# Patient Record
Sex: FEMALE | Race: WHITE | Employment: STUDENT | URBAN - METROPOLITAN AREA
[De-identification: names, ages, dates, MRNs, and addresses within clinical notes are randomized per-mention and may not be internally consistent; named-entity substitution may affect disease eponyms.]

---
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
## 2025-01-14 ENCOUNTER — MED REC SCAN ONLY (OUTPATIENT)
Dept: FAMILY MEDICINE CLINIC | Facility: CLINIC | Age: 20
End: 2025-01-14

## 2025-03-11 ENCOUNTER — HOSPITAL ENCOUNTER (OUTPATIENT)
Age: 20
Discharge: HOME | End: 2025-03-11
Payer: COMMERCIAL

## 2025-03-11 VITALS
DIASTOLIC BLOOD PRESSURE: 88 MMHG | OXYGEN SATURATION: 98 % | RESPIRATION RATE: 21 BRPM | TEMPERATURE: 97 F | SYSTOLIC BLOOD PRESSURE: 106 MMHG | HEART RATE: 85 BPM

## 2025-03-11 DIAGNOSIS — R73.9 HYPERGLYCEMIA: ICD-10-CM

## 2025-03-11 DIAGNOSIS — Z79.4 DIABETES MELLITUS DUE TO UNDERLYING CONDITION WITH HYPERGLYCEMIA, WITH LONG-TERM CURRENT USE OF INSULIN (HCC): ICD-10-CM

## 2025-03-11 DIAGNOSIS — T85.694A OTHER MECHANICAL COMPLICATION OF INSULIN PUMP, INITIAL ENCOUNTER: ICD-10-CM

## 2025-03-11 DIAGNOSIS — E08.65 DIABETES MELLITUS DUE TO UNDERLYING CONDITION WITH HYPERGLYCEMIA, WITH LONG-TERM CURRENT USE OF INSULIN (HCC): ICD-10-CM

## 2025-03-11 DIAGNOSIS — R53.83 OTHER FATIGUE: Primary | ICD-10-CM

## 2025-03-11 LAB
#MXD IC: 1.1 X10ˆ3/UL (ref 0.1–1)
BUN BLD-MCNC: 6 MG/DL (ref 7–18)
CHLORIDE BLD-SCNC: 103 MMOL/L (ref 98–112)
CO2 BLD-SCNC: 19 MMOL/L (ref 21–32)
COLOR UR: YELLOW
CREAT BLD-MCNC: 0.7 MG/DL
EGFRCR SERPLBLD CKD-EPI 2021: 128 ML/MIN/1.73M2 (ref 60–?)
GLUCOSE BLD-MCNC: 278 MG/DL (ref 70–99)
GLUCOSE BLD-MCNC: 309 MG/DL (ref 70–99)
GLUCOSE UR STRIP-MCNC: 500 MG/DL
HCT VFR BLD AUTO: 46.2 %
HCT VFR BLD CALC: 51 %
HGB BLD-MCNC: 15.8 G/DL
ISTAT IONIZED CALCIUM FOR CHEM 8: 1.19 MMOL/L (ref 1.12–1.32)
KETONES UR STRIP-MCNC: 80 MG/DL
LYMPHOCYTES # BLD AUTO: 3.2 X10ˆ3/UL (ref 1.5–5)
LYMPHOCYTES NFR BLD AUTO: 29.1 %
MCH RBC QN AUTO: 28.3 PG (ref 26–34)
MCHC RBC AUTO-ENTMCNC: 34.2 G/DL (ref 31–37)
MCV RBC AUTO: 82.8 FL (ref 80–100)
MIXED CELL %: 9.9 %
NEUTROPHILS # BLD AUTO: 6.6 X10ˆ3/UL (ref 1.5–7.7)
NEUTROPHILS NFR BLD AUTO: 61 %
NITRITE UR QL STRIP: NEGATIVE
PH UR STRIP: 6 [PH]
PLATELET # BLD AUTO: 385 X10ˆ3/UL (ref 150–450)
POTASSIUM BLD-SCNC: 3.8 MMOL/L (ref 3.6–5.1)
PROT UR STRIP-MCNC: 100 MG/DL
RBC # BLD AUTO: 5.58 X10ˆ6/UL
SODIUM BLD-SCNC: 136 MMOL/L (ref 136–145)
SP GR UR STRIP: 1.02
UROBILINOGEN UR STRIP-ACNC: <2 MG/DL
WBC # BLD AUTO: 10.9 X10ˆ3/UL (ref 4–11)

## 2025-03-11 PROCEDURE — 85025 COMPLETE CBC W/AUTO DIFF WBC: CPT | Performed by: NURSE PRACTITIONER

## 2025-03-11 PROCEDURE — 82962 GLUCOSE BLOOD TEST: CPT | Performed by: NURSE PRACTITIONER

## 2025-03-11 PROCEDURE — 96360 HYDRATION IV INFUSION INIT: CPT | Performed by: NURSE PRACTITIONER

## 2025-03-11 PROCEDURE — 80047 BASIC METABLC PNL IONIZED CA: CPT | Performed by: NURSE PRACTITIONER

## 2025-03-11 PROCEDURE — 81002 URINALYSIS NONAUTO W/O SCOPE: CPT | Performed by: NURSE PRACTITIONER

## 2025-03-11 PROCEDURE — 99214 OFFICE O/P EST MOD 30 MIN: CPT | Performed by: NURSE PRACTITIONER

## 2025-03-11 RX ORDER — BREXPIPRAZOLE 3 MG/1
3 TABLET ORAL
COMMUNITY
Start: 2025-03-01

## 2025-03-11 RX ORDER — ONDANSETRON 4 MG/1
4 TABLET, ORALLY DISINTEGRATING ORAL EVERY 4 HOURS PRN
Qty: 30 TABLET | Refills: 0 | Status: SHIPPED | OUTPATIENT
Start: 2025-03-11

## 2025-03-11 RX ORDER — SODIUM CHLORIDE 9 MG/ML
1000 INJECTION, SOLUTION INTRAVENOUS ONCE
Status: COMPLETED | OUTPATIENT
Start: 2025-03-11 | End: 2025-03-11

## 2025-03-11 RX ORDER — NAPROXEN SODIUM 220 MG
TABLET ORAL
Qty: 90 EACH | Refills: 0 | Status: SHIPPED | OUTPATIENT
Start: 2025-03-11

## 2025-03-11 NOTE — DISCHARGE INSTRUCTIONS
General Health Maintenance   - Overall goal: Improve body functioning through improved cellular health by decrease inflammation   - Move more (7k steps a day improves cardiovascular health - approx 1.5hrs of cumulative walking per day)   - Sleep better (consistent bedtime, sleep mask, magnesium glycinate)   - Eat \"real\" food (avoid high processed foods such as: snack foods, margarine, frozen foods, most breads)   - Avoid sugar which is very inflammatory and interferes with cellular function   - Avoid excessive dairy (milk, cheese, etc. [Yogurt is ok])   - Early exposure to daylight   - Drink more water (preferably filtered)   - Prioritize protein with every meal   - Supplement nutrient dietary deficiencies with multivitamins, extra vitamin D (2000IU), Magnesium glycinate, and probiotics daily, year round   - Yearly health monitoring by your primary care provider     *individual modifications may be required

## 2025-03-11 NOTE — ED PROVIDER NOTES
History     Chief Complaint   Patient presents with    Diabetes       Subjective:   HPI    Lily Herring, 19 year old adult with notable medical history of T1DM, celiac disease, low vitamin D, depression who presents with diabetes concern. Per patient and mother (with permission), patient has insulin pump, but has been having \"high\" glucose readings, needing to adjust insulin dosing with syringes. Insulin pump company was contacted an a replacement has been placed. Patient and mother c/f dehydration and/or other cause of her symptoms and fatigue. Patient has had a few instances of n/v. Of note, patient is trans, female>male      Patient Active Problem List   Diagnosis    Type 1 diabetes mellitus without complication (HCC)    Celiac disease (HCC)    Esotropia    Amblyopia of both eyes    Vitamin D deficiency, unspecified    Irregular menses    Current severe episode of major depressive disorder without psychotic features without prior episode (HCC)    Major depressive disorder, recurrent episode, severe (HCC)    Short stature (child)    Hydronephrosis      Objective:   Past Medical History:    Candidiasis, cutaneous    Celiac disease (HCC)    DIABETES    Dr Jeter, [place] [facility]    Diabetes type I (HCC)    Hydronephrosis    right greater than left    Type I (juvenile type) diabetes mellitus without mention of complication, not stated as uncontrolled              History reviewed. No pertinent surgical history.             Social History     Socioeconomic History    Marital status: Single   Tobacco Use    Smoking status: Never    Smokeless tobacco: Never   Vaping Use    Vaping status: Never Used   Substance and Sexual Activity    Alcohol use: No    Drug use: No    Sexual activity: Never   Other Topics Concern    Caffeine Concern Yes     Comment: 2-3 x/week    Exercise No    Seat Belt Yes   Social History Narrative    Lives with parents and is in 2nd grade as of july 2013.               Medications  Ordered Prior to Encounter[1]      Constitutional and vital signs reviewed.      All other systems reviewed and negative except as noted above.    I have reviewed the family history, social history, allergies, and outpatient medications.     History reviewed from EMR: Encounters, problem list, allergies, medications      Physical Exam     ED Triage Vitals [03/11/25 1449]   /88   Pulse (!) 132   Resp 21   Temp 97.3 °F (36.3 °C)   Temp src Oral   SpO2 98 %   O2 Device None (Room air)       Current:/88   Pulse 85   Temp 97.3 °F (36.3 °C) (Oral)   Resp 21   LMP 07/03/2024 (Approximate)   SpO2 98%       Physical Exam  Vitals and nursing note reviewed.   Constitutional:       General: He is not in acute distress.     Appearance: Normal appearance. He is normal weight. He is not ill-appearing or toxic-appearing.   HENT:      Head: Normocephalic and atraumatic.      Right Ear: External ear normal.      Left Ear: External ear normal.      Nose: Nose normal.      Mouth/Throat:      Mouth: Mucous membranes are moist.   Eyes:      Extraocular Movements: Extraocular movements intact.      Conjunctiva/sclera: Conjunctivae normal.      Pupils: Pupils are equal, round, and reactive to light.   Cardiovascular:      Rate and Rhythm: Tachycardia present.      Pulses: Normal pulses.   Pulmonary:      Effort: Pulmonary effort is normal. No respiratory distress.   Musculoskeletal:         General: No swelling, tenderness or signs of injury. Normal range of motion.      Cervical back: Normal range of motion and neck supple.   Skin:     General: Skin is warm and dry.      Capillary Refill: Capillary refill takes less than 2 seconds.      Coloration: Skin is not jaundiced.   Neurological:      General: No focal deficit present.      Mental Status: He is alert and oriented to person, place, and time. Mental status is at baseline.   Psychiatric:         Mood and Affect: Mood normal.         Behavior: Behavior normal.          Thought Content: Thought content normal.         Judgment: Judgment normal.            ED Course     Labs Reviewed   POCT CBC - Abnormal; Notable for the following components:       Result Value    # Mixed Cells 1.1 (*)     All other components within normal limits   POCT GLUCOSE - Abnormal; Notable for the following components:    POC Glucose 278 (*)     All other components within normal limits   [facility] POCT URINALYSIS DIPSTICK - Abnormal; Notable for the following components:    Urine Clarity Cloudy (*)     Protein urine 100 (*)     Glucose, Urine 500 (*)     Ketone, Urine 80 (*)     Bilirubin, Urine Moderate (*)     Blood, Urine Trace-Intact (*)     Leukocyte esterase urine Trace (*)     All other components within normal limits   POCT ISTAT CHEM8 CARTRIDGE - Abnormal; Notable for the following components:    ISTAT BUN 6 (*)     ISTAT Glucose 309 (*)     ISTAT TCO2 19 (*)     All other components within normal limits     No orders to display       Vitals:    03/11/25 1449 03/11/25 1541   BP: 106/88    Pulse: (!) 132 85   Resp: 21    Temp: 97.3 °F (36.3 °C)    TempSrc: Oral    SpO2: 98%             MDM        Lily A Herring, 19 year old adult with medical history as noted above who presents with diabetes / fatigue concern   - Patient in NAD, +tachycardia   - DKA vs HHNS vs dehydration vs viral vs other   - POC glucose 270mg/dL   - Patient does not appear to be in DKA   - Will check labs, urine, and give IVF       ** See ED course below for additional information on care provided / interventions / notable events throughout patient's encounter.    ** See Home Care Instructions below for care measures to trial as applicable.    ED Course as of 03/13/25 1114  ------------------------------------------------------------  Time: 03/11 1535  Comment: Labs w/o anemia and c/w hyperglycemia. No gross acidosis. Patient feeling well. No emesis or abdominal pain.  Supportive care discussed  RTED/IC precautions discussed  Close  f/u with care team       ** I have independently reviewed the radiology images, clinical lab results, and ECG tracings as described above (if applicable)    ** Concerning co-morbidities possibly affecting complaint / care: T1DM    ** See disposition & plan section below for home care instructions - if applicable        Medical Decision Making  Amount and/or Complexity of Data Reviewed  Labs: ordered. Decision-making details documented in ED Course.    Risk  OTC drugs.        Disposition and Plan     Disposition:  Discharge  3/11/2025  3:37 pm    Clinical Impression:  1. Other fatigue    2. Diabetes mellitus due to underlying condition with hyperglycemia, with long-term current use of insulin (HCC)    3. Hyperglycemia            Home care instructions:    General Health Maintenance   - Overall goal: Improve body functioning through improved cellular health by decrease inflammation   - Move more (7k steps a day improves cardiovascular health - approx 1.5hrs of cumulative walking per day)   - Sleep better (consistent bedtime, sleep mask, magnesium glycinate)   - Eat \"real\" food (avoid high processed foods such as: snack foods, margarine, frozen foods, most breads)   - Avoid sugar which is very inflammatory and interferes with cellular function   - Avoid excessive dairy (milk, cheese, etc. [Yogurt is ok])   - Early exposure to daylight   - Drink more water (preferably filtered)   - Prioritize protein with every meal   - Supplement nutrient dietary deficiencies with multivitamins, extra vitamin D (2000IU min.), Magnesium glycinate, and probiotics daily, year round   - Yearly health monitoring by your primary care provider     *individual modifications may be required      Follow-up:  No follow-up provider specified.        Medications Prescribed:  Discharge Medication List as of 3/11/2025  4:12 PM        START taking these medications    Details   Insulin Syringe 31G X 5/16\" 0.5 ML Does not apply Misc Use subcutaneously  as previously directed, Print, Disp-90 each, [id number] to adjust quantity or type based on availability      !! ondansetron 4 MG Oral Tablet Dispersible Take 1 tablet (4 mg total) by mouth every 4 (four) hours as needed for Nausea., Normal, Disp-30 tablet, R-0       !! - Potential duplicate medications found. Please discuss with provider.            Ancelmo Brooks, DNP, APRN, AGACNP-BC, FNP-C, CNL  Adult-Gerontology Acute Care & Family Nurse Practitioner  [facility]      The above patient (and/or guardian) was made aware that an appropriate evaluation has been performed, and that no additional testing is required at this time. In my medical judgment, there is currently no evidence of an immediate life-threatening or surgical condition, therefore discharge is indicated at this time. The patient (and/or guardian) was advised that a small risk still exists that a serious condition could develop. The patient was instructed to arrange close follow-up with their primary care provider (or the referral provider given today). The patient received written and verbal instructions regarding their condition / concerns, demonstrated understanding, and is agreement with the outpatient treatment plan.            [1]   No current facility-administered medications on file prior to encounter.     Current Outpatient Medications on File Prior to Encounter   Medication Sig Dispense Refill    REXULTI 3 MG Oral Tab 3 mg.      ondansetron 4 MG Oral Tablet Dispersible Place 1 tablet (4 mg total) under the tongue.      traZODone 50 MG Oral Tab Take 1 tablet (50 mg total) by mouth nightly. (Patient taking differently: Take 1.5 tablets (75 mg total) by mouth nightly as needed.) 30 tablet 0    Norethindrone-Eth Estradiol (DASETTA 1/35) 1-35 MG-MCG Oral Tab Take 1 tablet by mouth daily. 84 tablet 3    Glucose Blood (ONETOUCH VERIO) In Vitro Strip Test blood glucose 5 times daily as needed: fasting, before each meal, before bedtime. 400  strip 3    FLUoxetine HCl 40 MG Oral Cap 1 capsule (40 mg total). (Patient not taking: Reported on 3/11/2025)      Glucagon (BAQSIMI ONE PACK NA) 3 mg by Nasal route as needed.      Brexpiprazole 1 MG Oral Tab Take 3 mg by mouth daily.      Continuous Blood Gluc Sensor (DEXCOM G6 SENSOR) Does not apply Misc Apply 1 Device topically Every 10 days. CHANGE SENSOR EVERY 10 DAYS      Continuous Blood Gluc Transmit (DEXCOM G6 TRANSMITTER) Does not apply Misc every 3 (three) months.      NOVOLOG RELION 100 UNIT/ML Injection Solution INJECT 90 UNITS SUBCUTANEOUSLY ONCE DAILY AS DIRECTED VIA INSULIN PUMP      INSULIN LISPRO SC 0-100 Units by Insulin pump route 3 (three) times daily before meals. Per Insulin Pump:  Basal Rate 0.7 units/hour  Coverage and Correction TID with meals and HS      Glucose Blood (MESERET CONTOUR NEXT TEST) In Vitro Strip Check blood sugar up to 10 times daily as directed. 300 each 12

## 2025-03-11 NOTE — ED INITIAL ASSESSMENT (HPI)
Pt presents to the IC with c/o high glucose levels d/t an insulin pump that has been malfunctioning. Pt and family has been in contact with the company who is sending out a new pump. Pt has been fatigued and missing days at work. Notes feeling lightheaded.

## 2025-03-19 ENCOUNTER — TELEPHONE (OUTPATIENT)
Dept: PEDIATRIC ENDOCRINOLOGY | Age: 20
End: 2025-03-19

## 2025-03-28 ENCOUNTER — TELEPHONE (OUTPATIENT)
Dept: PEDIATRIC ENDOCRINOLOGY | Age: 20
End: 2025-03-28

## 2025-03-28 DIAGNOSIS — E10.9 TYPE 1 DIABETES MELLITUS WITHOUT COMPLICATION (CMD): ICD-10-CM

## 2025-03-31 RX ORDER — INSULIN ASPART 100 [IU]/ML
INJECTION, SOLUTION INTRAVENOUS; SUBCUTANEOUS
Qty: 90 ML | Refills: 0 | Status: SHIPPED | OUTPATIENT
Start: 2025-03-31

## 2025-06-06 ENCOUNTER — PATIENT MESSAGE (OUTPATIENT)
Dept: FAMILY MEDICINE CLINIC | Facility: CLINIC | Age: 20
End: 2025-06-06

## 2025-06-06 DIAGNOSIS — E10.9 TYPE 1 DIABETES MELLITUS WITHOUT COMPLICATION (HCC): Primary | ICD-10-CM

## 2025-06-09 NOTE — TELEPHONE ENCOUNTER
Referral placed for [facility]. Joann BUSTILLO notified to watch for referral.

## 2025-06-10 ASSESSMENT — ENCOUNTER SYMPTOMS
SLEEP DISTURBANCE: 0
TROUBLE SWALLOWING: 0
NERVOUS/ANXIOUS: 0
FEVER: 0
TREMORS: 0
NAUSEA: 0
FATIGUE: 0
DIARRHEA: 0
SHORTNESS OF BREATH: 0
RHINORRHEA: 0
COUGH: 0
DIZZINESS: 0
FACIAL SWELLING: 0
CHOKING: 0
UNEXPECTED WEIGHT CHANGE: 1
EYE REDNESS: 0
VOMITING: 0
SORE THROAT: 0
VOICE CHANGE: 0
HEADACHES: 0
WOUND: 0
PHOTOPHOBIA: 0
CONSTIPATION: 0
EYE PAIN: 0
ABDOMINAL PAIN: 0
POLYDIPSIA: 0

## 2025-06-11 ENCOUNTER — TELEPHONE (OUTPATIENT)
Dept: PEDIATRIC ENDOCRINOLOGY | Age: 20
End: 2025-06-11

## 2025-06-11 ENCOUNTER — APPOINTMENT (OUTPATIENT)
Dept: PEDIATRIC ENDOCRINOLOGY | Age: 20
End: 2025-06-11

## 2025-06-11 ENCOUNTER — TELEPHONE (OUTPATIENT)
Dept: ENDOCRINOLOGY | Age: 20
End: 2025-06-11

## 2025-06-11 VITALS
TEMPERATURE: 97.5 F | DIASTOLIC BLOOD PRESSURE: 85 MMHG | OXYGEN SATURATION: 98 % | SYSTOLIC BLOOD PRESSURE: 122 MMHG | WEIGHT: 128.53 LBS | HEART RATE: 86 BPM

## 2025-06-11 DIAGNOSIS — E55.9 VITAMIN D DEFICIENCY: ICD-10-CM

## 2025-06-11 DIAGNOSIS — Z96.41 PRESENCE OF HYBRID CLOSED-LOOP INSULIN PUMP SYSTEM: ICD-10-CM

## 2025-06-11 DIAGNOSIS — E10.9 TYPE 1 DIABETES MELLITUS WITHOUT COMPLICATION (CMD): ICD-10-CM

## 2025-06-11 DIAGNOSIS — K90.0 CELIAC DISEASE (CMD): ICD-10-CM

## 2025-06-11 DIAGNOSIS — Z78.9 VERBALIZES UNDERSTANDING OF SIGNS AND SYMPTOMS, PREVENTION, AND TREATMENT OF HYPERGLYCEMIA AND HYPOGLYCEMIA: ICD-10-CM

## 2025-06-11 DIAGNOSIS — E10.65 TYPE 1 DIABETES MELLITUS WITH HYPERGLYCEMIA  (CMD): ICD-10-CM

## 2025-06-11 DIAGNOSIS — Z97.8 USES SELF-APPLIED CONTINUOUS GLUCOSE MONITORING DEVICE: ICD-10-CM

## 2025-06-11 DIAGNOSIS — D50.9 IRON DEFICIENCY ANEMIA, UNSPECIFIED IRON DEFICIENCY ANEMIA TYPE: ICD-10-CM

## 2025-06-11 DIAGNOSIS — E10.65 TYPE 1 DIABETES MELLITUS WITH HYPERGLYCEMIA  (CMD): Primary | ICD-10-CM

## 2025-06-11 LAB — HBA1C MFR BLD: 6.5 % (ref 4.5–5.6)

## 2025-06-11 RX ORDER — GLUCAGON 3 MG/1
POWDER NASAL
Qty: 2 EACH | Refills: 0 | Status: SHIPPED | OUTPATIENT
Start: 2025-06-11

## 2025-06-11 RX ORDER — INSULIN LISPRO 100 [IU]/ML
INJECTION, SOLUTION INTRAVENOUS; SUBCUTANEOUS
Qty: 90 ML | Refills: 1 | Status: SHIPPED | OUTPATIENT
Start: 2025-06-11

## 2025-06-11 RX ORDER — INSULIN ASPART 100 [IU]/ML
INJECTION, SOLUTION INTRAVENOUS; SUBCUTANEOUS
Qty: 90 ML | Refills: 0 | Status: SHIPPED | OUTPATIENT
Start: 2025-06-11

## 2025-06-11 RX ORDER — DEXTROMETHORPHAN HYDROBROMIDE, BUPROPION HYDROCHLORIDE 105; 45 MG/1; MG/1
TABLET, MULTILAYER, EXTENDED RELEASE ORAL DAILY
COMMUNITY
Start: 2025-05-01

## 2025-06-11 RX ORDER — GLUCAGON INJECTION, SOLUTION 1 MG/.2ML
INJECTION, SOLUTION SUBCUTANEOUS
Qty: 1 EACH | Refills: 0 | Status: SHIPPED | OUTPATIENT
Start: 2025-06-11 | End: 2025-06-12 | Stop reason: SDUPTHER

## 2025-06-11 RX ORDER — GLUCAGON 3 MG/1
POWDER NASAL
Qty: 1 EACH | Refills: 0 | Status: SHIPPED | OUTPATIENT
Start: 2025-06-11 | End: 2025-06-11 | Stop reason: SDUPTHER

## 2025-06-12 RX ORDER — GLUCAGON INJECTION, SOLUTION 1 MG/.2ML
INJECTION, SOLUTION SUBCUTANEOUS
Qty: 1 EACH | Refills: 0 | Status: SHIPPED | OUTPATIENT
Start: 2025-06-12

## 2025-08-20 ENCOUNTER — LAB ENCOUNTER (OUTPATIENT)
Dept: LAB | Age: 20
End: 2025-08-20
Attending: STUDENT IN AN ORGANIZED HEALTH CARE EDUCATION/TRAINING PROGRAM

## 2025-08-20 ENCOUNTER — OFFICE VISIT (OUTPATIENT)
Dept: FAMILY MEDICINE CLINIC | Facility: CLINIC | Age: 20
End: 2025-08-20

## 2025-08-20 VITALS
WEIGHT: 124 LBS | HEIGHT: 65.5 IN | BODY MASS INDEX: 20.41 KG/M2 | TEMPERATURE: 97 F | SYSTOLIC BLOOD PRESSURE: 110 MMHG | HEART RATE: 81 BPM | DIASTOLIC BLOOD PRESSURE: 82 MMHG | RESPIRATION RATE: 14 BRPM | OXYGEN SATURATION: 97 %

## 2025-08-20 DIAGNOSIS — Z00.00 WELLNESS EXAMINATION: ICD-10-CM

## 2025-08-20 DIAGNOSIS — Z00.00 WELLNESS EXAMINATION: Primary | ICD-10-CM

## 2025-08-20 DIAGNOSIS — N92.6 IRREGULAR PERIODS: ICD-10-CM

## 2025-08-20 DIAGNOSIS — E10.9 TYPE 1 DIABETES MELLITUS WITHOUT COMPLICATION (HCC): ICD-10-CM

## 2025-08-20 LAB
ALBUMIN SERPL-MCNC: 4.5 G/DL (ref 3.2–4.8)
ALBUMIN/GLOB SERPL: 1.7 (ref 1–2)
ALP LIVER SERPL-CCNC: 64 U/L
ALT SERPL-CCNC: 8 U/L
ANION GAP SERPL CALC-SCNC: 11 MMOL/L (ref 0–18)
AST SERPL-CCNC: 18 U/L (ref ?–34)
BASOPHILS # BLD AUTO: 0.04 X10(3) UL (ref 0–0.2)
BASOPHILS NFR BLD AUTO: 0.7 %
BILIRUB SERPL-MCNC: 0.6 MG/DL (ref 0.3–1.2)
BUN BLD-MCNC: <5 MG/DL (ref 9–23)
CALCIUM BLD-MCNC: 9.6 MG/DL (ref 8.7–10.6)
CHLORIDE SERPL-SCNC: 105 MMOL/L (ref 98–112)
CHOLEST SERPL-MCNC: 123 MG/DL (ref ?–200)
CO2 SERPL-SCNC: 25 MMOL/L (ref 21–32)
CREAT BLD-MCNC: 0.82 MG/DL
CREAT UR-SCNC: 158.2 MG/DL
DEPRECATED HBV CORE AB SER IA-ACNC: 13 NG/ML
DHEA-S SERPL-MCNC: 227.7 UG/DL
EGFRCR SERPLBLD CKD-EPI 2021: 106 ML/MIN/1.73M2 (ref 60–?)
EOSINOPHIL # BLD AUTO: 0.13 X10(3) UL (ref 0–0.7)
EOSINOPHIL NFR BLD AUTO: 2.3 %
ERYTHROCYTE [DISTWIDTH] IN BLOOD BY AUTOMATED COUNT: 12.4 %
EST. AVERAGE GLUCOSE BLD GHB EST-MCNC: 151 MG/DL (ref 68–126)
ESTRADIOL SERPL-MCNC: 140.6 PG/ML
FASTING PATIENT LIPID ANSWER: YES
FASTING STATUS PATIENT QL REPORTED: YES
FSH SERPL-ACNC: 7.6 MIU/ML
GLOBULIN PLAS-MCNC: 2.7 G/DL (ref 2–3.5)
GLUCOSE BLD-MCNC: 155 MG/DL (ref 70–99)
HBA1C MFR BLD: 6.9 % (ref ?–5.7)
HCT VFR BLD AUTO: 38.9 %
HDLC SERPL-MCNC: 46 MG/DL (ref 40–59)
HGB BLD-MCNC: 13.2 G/DL (ref 7–20)
IGA SERPL-MCNC: 184.8 MG/DL (ref 40–350)
IGM SERPL-MCNC: 129.3 MG/DL (ref 50–300)
IMM GRANULOCYTES # BLD AUTO: 0.01 X10(3) UL (ref 0–1)
IMM GRANULOCYTES NFR BLD: 0.2 %
IMMUNOGLOBULIN PNL SER-MCNC: 970 MG/DL (ref 650–1600)
IRON SATN MFR SERPL: 39 %
IRON SERPL-MCNC: 129 UG/DL
LDLC SERPL CALC-MCNC: 63 MG/DL (ref ?–100)
LH SERPL-ACNC: 16.7 MIU/ML
LYMPHOCYTES # BLD AUTO: 2.21 X10(3) UL (ref 1.5–5)
LYMPHOCYTES NFR BLD AUTO: 39.1 %
MCH RBC QN AUTO: 28.8 PG (ref 26–34)
MCHC RBC AUTO-ENTMCNC: 33.9 G/DL (ref 31–37)
MCV RBC AUTO: 84.7 FL
MICROALBUMIN UR-MCNC: 1.1 MG/DL
MICROALBUMIN/CREAT 24H UR-RTO: 7 UG/MG (ref ?–30)
MONOCYTES # BLD AUTO: 0.47 X10(3) UL (ref 0.1–1)
MONOCYTES NFR BLD AUTO: 8.3 %
NEUTROPHILS # BLD AUTO: 2.79 X10 (3) UL (ref 1.5–7.7)
NEUTROPHILS # BLD AUTO: 2.79 X10(3) UL (ref 1.5–7.7)
NEUTROPHILS NFR BLD AUTO: 49.4 %
NONHDLC SERPL-MCNC: 77 MG/DL (ref ?–130)
PLATELET # BLD AUTO: 265 10(3)UL (ref 150–450)
POTASSIUM SERPL-SCNC: 4.2 MMOL/L (ref 3.5–5.1)
PROGEST SERPL-MCNC: 0.59 NG/ML
PROLACTIN SERPL-MCNC: 10.8 NG/ML
PROT SERPL-MCNC: 7.2 G/DL (ref 5.7–8.2)
RBC # BLD AUTO: 4.59 X10(6)UL
SODIUM SERPL-SCNC: 141 MMOL/L (ref 136–145)
T4 FREE SERPL-MCNC: 1.3 NG/DL (ref 0.9–1.6)
TOTAL IRON BINDING CAPACITY: 335 UG/DL (ref 250–425)
TRANSFERRIN SERPL-MCNC: 267 MG/DL (ref 200–360)
TRIGL SERPL-MCNC: 65 MG/DL (ref 30–149)
TSI SER-ACNC: 1.22 UIU/ML (ref 0.48–4.17)
VIT D+METAB SERPL-MCNC: 31.9 NG/ML (ref 30–100)
VLDLC SERPL CALC-MCNC: 10 MG/DL (ref 0–30)
WBC # BLD AUTO: 5.7 X10(3) UL (ref 4–11)

## 2025-08-20 PROCEDURE — 84144 ASSAY OF PROGESTERONE: CPT

## 2025-08-20 PROCEDURE — 83001 ASSAY OF GONADOTROPIN (FSH): CPT

## 2025-08-20 PROCEDURE — 83550 IRON BINDING TEST: CPT

## 2025-08-20 PROCEDURE — 86364 TISS TRNSGLTMNASE EA IG CLAS: CPT

## 2025-08-20 PROCEDURE — 80053 COMPREHEN METABOLIC PANEL: CPT

## 2025-08-20 PROCEDURE — 84146 ASSAY OF PROLACTIN: CPT

## 2025-08-20 PROCEDURE — 36415 COLL VENOUS BLD VENIPUNCTURE: CPT

## 2025-08-20 PROCEDURE — 82627 DEHYDROEPIANDROSTERONE: CPT

## 2025-08-20 PROCEDURE — 82043 UR ALBUMIN QUANTITATIVE: CPT

## 2025-08-20 PROCEDURE — 83036 HEMOGLOBIN GLYCOSYLATED A1C: CPT

## 2025-08-20 PROCEDURE — 82728 ASSAY OF FERRITIN: CPT

## 2025-08-20 PROCEDURE — 82306 VITAMIN D 25 HYDROXY: CPT

## 2025-08-20 PROCEDURE — 3074F SYST BP LT 130 MM HG: CPT | Performed by: STUDENT IN AN ORGANIZED HEALTH CARE EDUCATION/TRAINING PROGRAM

## 2025-08-20 PROCEDURE — 84410 TESTOSTERONE BIOAVAILABLE: CPT

## 2025-08-20 PROCEDURE — 82670 ASSAY OF TOTAL ESTRADIOL: CPT

## 2025-08-20 PROCEDURE — 82784 ASSAY IGA/IGD/IGG/IGM EACH: CPT

## 2025-08-20 PROCEDURE — 99395 PREV VISIT EST AGE 18-39: CPT | Performed by: STUDENT IN AN ORGANIZED HEALTH CARE EDUCATION/TRAINING PROGRAM

## 2025-08-20 PROCEDURE — 82570 ASSAY OF URINE CREATININE: CPT

## 2025-08-20 PROCEDURE — 83002 ASSAY OF GONADOTROPIN (LH): CPT

## 2025-08-20 PROCEDURE — 3008F BODY MASS INDEX DOCD: CPT | Performed by: STUDENT IN AN ORGANIZED HEALTH CARE EDUCATION/TRAINING PROGRAM

## 2025-08-20 PROCEDURE — 85025 COMPLETE CBC W/AUTO DIFF WBC: CPT

## 2025-08-20 PROCEDURE — 3079F DIAST BP 80-89 MM HG: CPT | Performed by: STUDENT IN AN ORGANIZED HEALTH CARE EDUCATION/TRAINING PROGRAM

## 2025-08-20 PROCEDURE — 84439 ASSAY OF FREE THYROXINE: CPT

## 2025-08-20 PROCEDURE — 80061 LIPID PANEL: CPT

## 2025-08-20 PROCEDURE — 84443 ASSAY THYROID STIM HORMONE: CPT

## 2025-08-20 PROCEDURE — 83540 ASSAY OF IRON: CPT

## 2025-08-20 RX ORDER — GLUCAGON 3 MG/1
POWDER NASAL AS NEEDED
COMMUNITY
Start: 2025-06-11

## 2025-08-20 RX ORDER — DEXTROMETHORPHAN HYDROBROMIDE, BUPROPION HYDROCHLORIDE 105; 45 MG/1; MG/1
1 TABLET, MULTILAYER, EXTENDED RELEASE ORAL 2 TIMES DAILY
COMMUNITY

## 2025-08-20 RX ORDER — BLOOD SUGAR DIAGNOSTIC
STRIP MISCELLANEOUS
Qty: 400 STRIP | Refills: 3 | Status: SHIPPED | OUTPATIENT
Start: 2025-08-20

## 2025-08-20 RX ORDER — GLUCAGON INJECTION, SOLUTION 1 MG/.2ML
INJECTION, SOLUTION SUBCUTANEOUS
COMMUNITY
Start: 2025-06-13

## 2025-08-21 LAB — TTG IGA SER-ACNC: 2.1 U/ML (ref ?–7)

## 2025-08-28 LAB
SEX HORM BIND GLOB: 97.4 NMOL/L
TESTOST % FREE+WEAK BND: 8 %
TESTOST FREE+WEAK BND: 4.5 NG/DL
TESTOSTERONE TOT /MS: 55.8 NG/DL

## 2025-09-17 ENCOUNTER — APPOINTMENT (OUTPATIENT)
Age: 20
End: 2025-09-17

## (undated) DIAGNOSIS — E10.9 TYPE 1 DIABETES MELLITUS WITHOUT COMPLICATION (HCC): Primary | ICD-10-CM

## (undated) NOTE — LETTER
To Whom It May Concern:    [place], : 10/21/2005 has been under my care regarding ongoing medical issues. Because of this, this patient has been required to restrict physical activity. The patient may resume usual activities, including gym class, with the following restrictions: patient may participate in low-intensity exercise only. We will follow up in 2023 to reassess and further recommendations will follow. Please feel free to contact us if there are any questions.       Sincerely,          Qamar Celeste MD  [facility]  10/28/22  [facility] [place]  [phone]        Document generated by:  Renetta Wylie MD

## (undated) NOTE — LETTER
Date & Time: 3/11/2025, 3:37 PM  Patient: Lily Herring  Encounter Provider(s):    Ancelmo Brooks APRN       To Whom It May Concern:    Lily Herring was seen and treated in our department on 03/11/25. Please excuse him from work until 03/13/25 when she can return if without fever (<100.4) and if feeling better.    If you have any questions or concerns, please do not hesitate to call.        __________________________________________  Ancelmo Brooks DNP, WISAM, AGACNP-BC, FNP-C, CNL  Adult-Gerontology Acute Care & Family Nurse Practitioner  [facility]

## (undated) NOTE — LETTER
VACCINE ADMINISTRATION RECORD  PARENT / GUARDIAN APPROVAL  Date: 2022  Vaccine administered to: Lily Ellison Call     : 10/21/2005    MRN: [id number]    A copy of the appropriate Centers for Disease Control and Prevention Vaccine Information statement has been provided. I have read or have had explained the information about the diseases and the vaccines listed below. There was an opportunity to ask questions and any questions were answered satisfactorily. I believe that I understand the benefits and risks of the vaccine cited and ask that the vaccine(s) listed below be given to me or to the person named above (for whom I am authorized to make this request). VACCINES ADMINISTERED:  MENINGOCOCCAL VACCINE  FLU VACCINE    I have read and hereby agree to be bound by the terms of this agreement as stated above. My signature is valid until revoked by me in writing. This document is signed by Edmond Markmichel, relationship: mother on 2022.:                                                                                                                                         Parent / Guardian Signature                                                Date    Supriya Ann MA served as a witness to authentication that the identity of the person signing electronically is in fact the person represented as signing. This document was generated by Supriya Ann MA on 2022.

## (undated) NOTE — LETTER
Date & Time: 3/11/2025, 3:44 PM  Patient: Lily Herring  Encounter Provider(s):    Ancelmo Brooks APRN       To Whom It May Concern:    Lily (Chucky) VAHE Herring was seen and treated in our department on 03/11/25. Please excuse him from work until 03/12/25 when he can return if without fever (<100.4) and if feeling better.    If you have any questions or concerns, please do not hesitate to call.        __________________________________________  Ancelmo Brooks DNP, APRN, AGACNP-BC, FNP-C, CNL  Adult-Gerontology Acute Care & Family Nurse Practitioner  [facility]

## (undated) NOTE — LETTER
November 11, 2022   Tsering Richards, [address] [place] [address]    Patient: Rajesh Francois   MR Number: [id number]   YOB: 2005   Date of Visit: 11/11/2022        Dear Ayleen Ballard Lamp:    Your patient, Marj Yeager, was recently seen and treated in our department. Attached to this letter is a summary of that visit. If you have any questions or concerns, please don't hesitate to call.     Sincerely,        WISAM Castañeda